# Patient Record
Sex: MALE | Race: WHITE | NOT HISPANIC OR LATINO | URBAN - METROPOLITAN AREA
[De-identification: names, ages, dates, MRNs, and addresses within clinical notes are randomized per-mention and may not be internally consistent; named-entity substitution may affect disease eponyms.]

---

## 2023-06-25 PROBLEM — 236071009: Status: ACTIVE | Noted: 2023-06-25

## 2023-06-25 PROBLEM — 197125005: Status: ACTIVE | Noted: 2023-06-25

## 2023-06-26 ENCOUNTER — LAB OUTSIDE AN ENCOUNTER (OUTPATIENT)
Dept: URBAN - METROPOLITAN AREA CLINIC 113 | Facility: CLINIC | Age: 38
End: 2023-06-26

## 2023-06-26 ENCOUNTER — OFFICE VISIT (OUTPATIENT)
Dept: URBAN - METROPOLITAN AREA CLINIC 113 | Facility: CLINIC | Age: 38
End: 2023-06-26
Payer: COMMERCIAL

## 2023-06-26 ENCOUNTER — WEB ENCOUNTER (OUTPATIENT)
Dept: URBAN - METROPOLITAN AREA CLINIC 113 | Facility: CLINIC | Age: 38
End: 2023-06-26

## 2023-06-26 VITALS
TEMPERATURE: 97.6 F | RESPIRATION RATE: 14 BRPM | DIASTOLIC BLOOD PRESSURE: 80 MMHG | HEIGHT: 71 IN | HEART RATE: 79 BPM | BODY MASS INDEX: 25.2 KG/M2 | SYSTOLIC BLOOD PRESSURE: 129 MMHG | WEIGHT: 180 LBS

## 2023-06-26 DIAGNOSIS — R19.7 ACUTE DIARRHEA: ICD-10-CM

## 2023-06-26 DIAGNOSIS — K82.4 GALLBLADDER POLYP: ICD-10-CM

## 2023-06-26 DIAGNOSIS — R10.84 GENERALIZED ABDOMINAL PAIN: ICD-10-CM

## 2023-06-26 PROBLEM — 102614006: Status: ACTIVE | Noted: 2023-06-26

## 2023-06-26 PROBLEM — 197433003: Status: ACTIVE | Noted: 2023-06-26

## 2023-06-26 PROCEDURE — 99204 OFFICE O/P NEW MOD 45 MIN: CPT | Performed by: INTERNAL MEDICINE

## 2023-06-26 RX ORDER — POLYETHYLENE GLYCOL 3350, SODIUM CHLORIDE, SODIUM BICARBONATE, POTASSIUM CHLORIDE 420; 11.2; 5.72; 1.48 G/4L; G/4L; G/4L; G/4L
AS DIRECTED POWDER, FOR SOLUTION ORAL ONCE
Qty: 420 GM | Refills: 0 | OUTPATIENT
Start: 2023-06-26 | End: 2023-06-27

## 2023-06-26 RX ORDER — FAMOTIDINE 40 MG/1
1 TABLET AT BEDTIME TABLET, FILM COATED ORAL ONCE A DAY
Status: ACTIVE | COMMUNITY

## 2023-06-26 RX ORDER — COLESEVELAM HYDROCHLORIDE 625 MG/1
3 TABLETS WITH MEALS TABLET, COATED ORAL TWICE A DAY
Status: ACTIVE | COMMUNITY

## 2023-06-26 NOTE — HPI-TODAY'S VISIT:
37-year-old referred by Dr. Cyndee Kumar as a another opinion regarding chronic diarrhea.  A copy of this report will be sent to her office.  He has been on Colesevam 625 mg 2 tablets a day and famotidine. The patient states that he's had diarrhea since 2009.  This was during his  service.  Prior to that he had no episodes of diarrhea.  He saw the  doctors but not no etiology was identified.  Would last 4-5 years he's been having worsening diarrhea symptoms.  He saw a gastroenterologist about 3-4 months ago but could not get back in touch with them and he does not remember the names but it was in [place].  They did blood work but nothing else was performed.  He has one to 3 bowel movements per day.  He can be soft or watery sometimes it can even be formed.  He has had some weight loss about 5-10 pounds.  If he does not eat it helps his diarrhea.  He's noticed no blood or melena.  He does get some abdominal pains sometimes in the upper abdomen and sometimes in the lower abdomen.  The lower abdomens are more common.  He rarely will take ibuprofen but has not had any recently.  He has no heartburn but he does use famotidine if he gets up.  He has had no dysphagia.  There is no fevers or chills or skin rashes.  There is no joint pains.  He denies any nausea or vomiting.  He does have a cousin with Crohn's disease.  He does get gaseousness and bloating which includes flatus and night before he starts having diarrhea in the morning.

## 2023-06-28 LAB
(TTG) AB, IGA: <1
(TTG) AB, IGG: <1
A/G RATIO: 1.5
ABSOLUTE BASOPHILS: 117
ABSOLUTE EOSINOPHILS: 179
ABSOLUTE LYMPHOCYTES: 1718
ABSOLUTE MONOCYTES: 697
ABSOLUTE NEUTROPHILS: 4188
ALBUMIN: 4.5
ALKALINE PHOSPHATASE: 65
ALT (SGPT): 14
ANTIGLIADIN ABS, IGA: 22.1
AST (SGOT): 18
BASOPHILS: 1.7
BILIRUBIN, TOTAL: 0.5
BUN/CREATININE RATIO: (no result)
BUN: 8
C-REACTIVE PROTEIN, QUANT: 3.1
CALCIUM: 9.3
CARBON DIOXIDE, TOTAL: 25
CHLORIDE: 103
CREATININE: 0.81
EGFR: 116
EOSINOPHILS: 2.6
GLIADIN (DEAMIDATED) AB (IGA): 22.1
GLIADIN (DEAMIDATED) AB (IGG): <1
GLOBULIN, TOTAL: 3
GLUCOSE: 60
HEMATOCRIT: 43.5
HEMOGLOBIN: 14.6
IMMUNOGLOBULIN A: 394
LYMPHOCYTES: 24.9
MCH: 31
MCHC: 33.6
MCV: 92.4
MONOCYTES: 10.1
MPV: 11
NEUTROPHILS: 60.7
PLATELET COUNT: 309
POTASSIUM: (no result)
PROTEIN, TOTAL: 7.5
RDW: 12.4
RED BLOOD CELL COUNT: 4.71
SODIUM: 138
T-TRANSGLUTAMINASE (TTG) IGA: <1
WHITE BLOOD CELL COUNT: 6.9

## 2023-07-13 LAB
CALPROTECTIN, FECAL: 143
CLOSTRIDIUM DIFFICILE: (no result)
GIARDIA LAMBLIA AG, EIA: (no result)
LACTOFERRIN, FECAL, QUANT.: 26.1
OVA AND PARASITES, CONC AND PERM SMEAR: (no result)
PANCREATIC ELASTASE, FECAL: >500

## 2023-07-24 ENCOUNTER — OUT OF OFFICE VISIT (OUTPATIENT)
Dept: URBAN - METROPOLITAN AREA SURGERY CENTER 25 | Facility: SURGERY CENTER | Age: 38
End: 2023-07-24
Payer: COMMERCIAL

## 2023-07-24 ENCOUNTER — WEB ENCOUNTER (OUTPATIENT)
Dept: URBAN - METROPOLITAN AREA SURGERY CENTER 25 | Facility: SURGERY CENTER | Age: 38
End: 2023-07-24

## 2023-07-24 ENCOUNTER — TELEPHONE ENCOUNTER (OUTPATIENT)
Dept: URBAN - METROPOLITAN AREA CLINIC 113 | Facility: CLINIC | Age: 38
End: 2023-07-24

## 2023-07-24 ENCOUNTER — CLAIMS CREATED FROM THE CLAIM WINDOW (OUTPATIENT)
Dept: URBAN - METROPOLITAN AREA CLINIC 4 | Facility: CLINIC | Age: 38
End: 2023-07-24
Payer: COMMERCIAL

## 2023-07-24 DIAGNOSIS — K63.89 OTHER SPECIFIED DISEASES OF INTESTINE: ICD-10-CM

## 2023-07-24 DIAGNOSIS — K63.89 APPENDICITIS EPIPLOICA: ICD-10-CM

## 2023-07-24 DIAGNOSIS — K52.9 CHRONIC COLITIS: ICD-10-CM

## 2023-07-24 DIAGNOSIS — R19.7 ACUTE DIARRHEA: ICD-10-CM

## 2023-07-24 DIAGNOSIS — R10.84 ABDOMINAL CRAMPING, GENERALIZED: ICD-10-CM

## 2023-07-24 DIAGNOSIS — K52.89 OTHER SPECIFIED NONINFECTIVE GASTROENTERITIS AND COLITIS: ICD-10-CM

## 2023-07-24 DIAGNOSIS — R10.84 GENERALIZED ABDOMINAL PAIN: ICD-10-CM

## 2023-07-24 PROCEDURE — 00811 ANES LWR INTST NDSC NOS: CPT | Performed by: NURSE ANESTHETIST, CERTIFIED REGISTERED

## 2023-07-24 PROCEDURE — G8907 PT DOC NO EVENTS ON DISCHARG: HCPCS | Performed by: INTERNAL MEDICINE

## 2023-07-24 PROCEDURE — 00811 ANES LWR INTST NDSC NOS: CPT | Performed by: ANESTHESIOLOGY

## 2023-07-24 PROCEDURE — 88342 IMHCHEM/IMCYTCHM 1ST ANTB: CPT | Performed by: PATHOLOGY

## 2023-07-24 PROCEDURE — 45380 COLONOSCOPY AND BIOPSY: CPT | Performed by: INTERNAL MEDICINE

## 2023-07-24 PROCEDURE — 88305 TISSUE EXAM BY PATHOLOGIST: CPT | Performed by: PATHOLOGY

## 2023-07-24 RX ORDER — FAMOTIDINE 40 MG/1
1 TABLET AT BEDTIME TABLET, FILM COATED ORAL ONCE A DAY
Status: ACTIVE | COMMUNITY

## 2023-07-24 RX ORDER — COLESEVELAM HYDROCHLORIDE 625 MG/1
3 TABLETS WITH MEALS TABLET, COATED ORAL TWICE A DAY
Status: ACTIVE | COMMUNITY

## 2023-08-14 ENCOUNTER — WEB ENCOUNTER (OUTPATIENT)
Dept: URBAN - METROPOLITAN AREA CLINIC 113 | Facility: CLINIC | Age: 38
End: 2023-08-14

## 2023-08-14 ENCOUNTER — OFFICE VISIT (OUTPATIENT)
Dept: URBAN - METROPOLITAN AREA CLINIC 113 | Facility: CLINIC | Age: 38
End: 2023-08-14
Payer: COMMERCIAL

## 2023-08-14 ENCOUNTER — LAB OUTSIDE AN ENCOUNTER (OUTPATIENT)
Dept: URBAN - METROPOLITAN AREA CLINIC 113 | Facility: CLINIC | Age: 38
End: 2023-08-14

## 2023-08-14 VITALS
SYSTOLIC BLOOD PRESSURE: 134 MMHG | HEART RATE: 71 BPM | HEIGHT: 71 IN | DIASTOLIC BLOOD PRESSURE: 79 MMHG | WEIGHT: 176.6 LBS | RESPIRATION RATE: 20 BRPM | TEMPERATURE: 98 F | BODY MASS INDEX: 24.72 KG/M2

## 2023-08-14 DIAGNOSIS — K82.4 GALLBLADDER POLYP: ICD-10-CM

## 2023-08-14 DIAGNOSIS — R19.7 CHRONIC DIARRHEA: ICD-10-CM

## 2023-08-14 DIAGNOSIS — R10.84 GENERALIZED ABDOMINAL PAIN: ICD-10-CM

## 2023-08-14 PROCEDURE — 99213 OFFICE O/P EST LOW 20 MIN: CPT | Performed by: INTERNAL MEDICINE

## 2023-08-14 RX ORDER — FAMOTIDINE 40 MG/1
1 TABLET AT BEDTIME TABLET, FILM COATED ORAL ONCE A DAY
Status: ACTIVE | COMMUNITY

## 2023-08-14 RX ORDER — METRONIDAZOLE 500 MG/1
1 TABLET TABLET ORAL THREE TIMES A DAY
Qty: 21 TABLET | Refills: 0 | OUTPATIENT
Start: 2023-08-14 | End: 2023-08-21

## 2023-08-14 RX ORDER — COLESEVELAM HYDROCHLORIDE 625 MG/1
3 TABLETS WITH MEALS TABLET, COATED ORAL TWICE A DAY
Status: ACTIVE | COMMUNITY

## 2023-08-14 NOTE — HPI-TODAY'S VISIT:
37-year-old referred by Dr. Cyndee Kumar as a another opinion regarding chronic diarrhea.  A copy of this report will be sent to her office.  He has been on Colesevam 625 mg 2 tablets a day and famotidine. The patient states that he's had diarrhea since 2009.  This was during his  service.  Prior to that he had no episodes of diarrhea.  He saw the  doctors but not no etiology was identified.  Would last 4-5 years he's been having worsening diarrhea symptoms.  He saw a gastroenterologist about 3-4 months ago but could not get back in touch with them and he does not remember the names but it was in [place].  They did blood work but nothing else was performed.  He has had some weight loss about 5-10 pounds.  He rarely will take ibuprofen but has not had any recently.  He has no heartburn but he does use famotidine if he gets up.  He has had no dysphagia.  There is no fevers or chills or skin rashes.  There is no joint pains.  He does have a cousin with Crohn's disease.  He does not feel that the bile binder has made any difference.  He typically has about 2-3 loose bowel moods per day.  There is no fevers or chills blood or melena.  There is no nausea or vomiting.  He occasionally has some cramps in the right upper quadrant area.  He denies any NSAIDs for at least the last couple of months.  Of interest is that he was treated with a medication in the past for about 14 days that made his symptoms go away for about 4 months.  He does not remember the name of that medication.  Blood work on 6/26/2023 revealed hemoglobin 14.6, WBC of 6.9 and platelet count 309,000.  Sodium 138.  Potassium was not done.  BUN 8 creatinine 0.81.  AST 18, ALT 14, alk phosphatase 65, total bili 0.5, albumin 4.5.  CRP is 3.1.

## 2023-08-14 NOTE — HPI-OTHER HISTORIES
Abdominal ultrasound on 8/7/2023 revealed a normal liver and common bile duct.  There is a 4 mm polyp in the gallbladder as well as 2 other tiny polyps 0.3 mm in size.  Labs on 6/26/2023 revealed stool for C. difficile negative, Giardia negative, ova and parasites negative.  Pancreatic fecal elastase greater than 500.  Lactoferrin elevated at 26.1.  Fecal calprotectin elevated at 143.  Celiac sprue studies revealed TTG IgG and IgA less than 1.  Gliadin deamidated IgA was elevated at 22.1 however IgG was negative.  Total IgA level was 394.  Colonoscopy on 7/24/2023 revealed a few spots of erythema in the terminal ileum biopsies revealed prominent lymphoid aggregates.  There was some patchy erythema of the ascending colon and proximal transverse colon biopsies revealed incidental active chronic colitis possibly consistent with NSAIDs.  The rest of the transverse colon, splenic flexure descending colon rectosigmoid and rectum were normal biopsies revealed no significant abnormality.

## 2023-09-05 ENCOUNTER — TELEPHONE ENCOUNTER (OUTPATIENT)
Dept: URBAN - METROPOLITAN AREA CLINIC 113 | Facility: CLINIC | Age: 38
End: 2023-09-05

## 2023-09-13 ENCOUNTER — TELEPHONE ENCOUNTER (OUTPATIENT)
Dept: URBAN - METROPOLITAN AREA CLINIC 113 | Facility: CLINIC | Age: 38
End: 2023-09-13

## 2023-09-18 ENCOUNTER — OFFICE VISIT (OUTPATIENT)
Dept: URBAN - METROPOLITAN AREA CLINIC 107 | Facility: CLINIC | Age: 38
End: 2023-09-18
Payer: COMMERCIAL

## 2023-09-18 VITALS
WEIGHT: 171 LBS | HEIGHT: 71 IN | DIASTOLIC BLOOD PRESSURE: 84 MMHG | SYSTOLIC BLOOD PRESSURE: 133 MMHG | TEMPERATURE: 98.2 F | RESPIRATION RATE: 18 BRPM | HEART RATE: 73 BPM | BODY MASS INDEX: 23.94 KG/M2

## 2023-09-18 DIAGNOSIS — K50.80 CROHN'S COLITIS: ICD-10-CM

## 2023-09-18 DIAGNOSIS — R10.84 GENERALIZED ABDOMINAL PAIN: ICD-10-CM

## 2023-09-18 DIAGNOSIS — R19.7 ACUTE DIARRHEA: ICD-10-CM

## 2023-09-18 PROBLEM — 235709008: Status: ACTIVE | Noted: 2023-09-18

## 2023-09-18 PROCEDURE — 99214 OFFICE O/P EST MOD 30 MIN: CPT | Performed by: NURSE PRACTITIONER

## 2023-09-18 RX ORDER — FAMOTIDINE 40 MG/1
1 TABLET AT BEDTIME TABLET, FILM COATED ORAL ONCE A DAY
Status: ACTIVE | COMMUNITY

## 2023-09-18 RX ORDER — BUDESONIDE 3 MG/1
3 CAPSULES CAPSULE ORAL ONCE A DAY
Qty: 270 CAPSULE | Refills: 0 | OUTPATIENT
Start: 2023-09-18

## 2023-09-18 RX ORDER — COLESEVELAM HYDROCHLORIDE 625 MG/1
3 TABLETS WITH MEALS TABLET, COATED ORAL TWICE A DAY
Status: ON HOLD | COMMUNITY

## 2023-09-18 NOTE — HPI-TODAY'S VISIT:
37-year-old gentleman presenting for follow-up regarding chronic diarrhea ongoing since 2009 refractory to.  Volume 625 mg 2 tablets daily. He states he has had diarrhea since 2009, beginning during his  service.  He was evaluated by  doctors but no etiology for diarrhea was identified.  He was evaluated by another gastroenterology group in [place] with blood work, but no other work-up had been completed.  He described a 5 to 10 pound weight loss.  He described 2-3 bowel movements per day.  Our work-up has included blood work and stool studies.  Blood work revealed a normal hemoglobin, WBC and platelet counts, normal sodium and kidney function.  Liver function tests were also within normal limits.  CRP was mildly elevated at 3.1.  Stool studies were negative for C. difficile, Giardia, ova and parasite.  Pancreatic fecal elastase was normal at greater than 500.  Lactoferrin and fecal calprotectin were both elevated suggestive of inflammation.  Celiac sprue studies revealed normal TTG IgG and IgA.  Creatinine deamidated IgA was elevated at 22.1, however IgG was negative.  Total IgA level was 394.  His colonoscopy in July revealed some erythema in the terminal ileum with biopsies showing prominent lymphoid aggregate.  There was also patchy erythema of the ascending colon and proximal transverse colon with biopsies revealing incidental active chronic colitis possibly consistent with NSAIDs.  He denied NSAID use.  He was recommended an MR enterography to evaluate for possible Crohn's disease.  He was recommended to stop his colesevelam and was provided a 7-day course of metronidazole 500 mg 3 times daily.  Mesalamine was a consideration.  MR enterography was performed 8/25/2023.  There was a grossly 2 cm segment of active inflammatory changes involving the terminal ileum suggesting infectious versus inflammatory terminal ileitis.  No prestenotic dilation, fibrostenotic change or evidence of penetration disease.  Given MRI enterography findings, he was recommended a course of Entocort. He continues with frequent diarrhea.  He describes urgency after meals.  Last week, he was having up to 10 bowel movements per day.  This morning, he woke up at 5 AM in order to be able to completely empty his bowels before leaving from [place] to come to his office appointment in [place].  He has already had at least 3 bowel movements this morning by 11 AM.  He describes urgency and abdominal discomfort with meals.  He took the course of metronidazole as recommended, and felt worse during that 7-days.  Since completing Flagyl, he does not feel that he is any better.

## 2023-09-28 ENCOUNTER — TELEPHONE ENCOUNTER (OUTPATIENT)
Dept: URBAN - METROPOLITAN AREA CLINIC 113 | Facility: CLINIC | Age: 38
End: 2023-09-28

## 2023-10-15 LAB
HEPATITIS B CORE AB TOTAL: (no result)
HEPATITIS B SURFACE ANTIGEN: (no result)
HEPATITIS C ANTIBODY: (no result)
MITOGEN-NIL: >10
QUANTIFERON NIL VALUE: 0.04
QUANTIFERON TB1 AG VALUE: 0.05
QUANTIFERON TB2 AG VALUE: 0.01
QUANTIFERON-TB GOLD PLUS: NEGATIVE

## 2023-10-19 ENCOUNTER — OFFICE VISIT (OUTPATIENT)
Dept: URBAN - METROPOLITAN AREA CLINIC 113 | Facility: CLINIC | Age: 38
End: 2023-10-19

## 2023-10-22 PROBLEM — 71833008: Status: ACTIVE | Noted: 2023-10-22

## 2023-10-23 ENCOUNTER — DASHBOARD ENCOUNTERS (OUTPATIENT)
Age: 38
End: 2023-10-23

## 2023-10-23 ENCOUNTER — OFFICE VISIT (OUTPATIENT)
Dept: URBAN - METROPOLITAN AREA CLINIC 113 | Facility: CLINIC | Age: 38
End: 2023-10-23
Payer: COMMERCIAL

## 2023-10-23 VITALS
HEIGHT: 71 IN | BODY MASS INDEX: 23.83 KG/M2 | WEIGHT: 170.2 LBS | SYSTOLIC BLOOD PRESSURE: 118 MMHG | DIASTOLIC BLOOD PRESSURE: 70 MMHG | TEMPERATURE: 98.9 F | HEART RATE: 72 BPM

## 2023-10-23 DIAGNOSIS — K50.818 CROHN'S DISEASE OF BOTH SMALL AND LARGE INTESTINE WITH OTHER COMPLICATION: ICD-10-CM

## 2023-10-23 DIAGNOSIS — K52.89 OTHER SPECIFIED NONINFECTIVE GASTROENTERITIS AND COLITIS: ICD-10-CM

## 2023-10-23 DIAGNOSIS — R10.84 GENERALIZED ABDOMINAL PAIN: ICD-10-CM

## 2023-10-23 PROCEDURE — 99203 OFFICE O/P NEW LOW 30 MIN: CPT | Performed by: INTERNAL MEDICINE

## 2023-10-23 RX ORDER — COLESEVELAM HYDROCHLORIDE 625 MG/1
3 TABLETS WITH MEALS TABLET, COATED ORAL TWICE A DAY
Status: ON HOLD | COMMUNITY

## 2023-10-23 RX ORDER — BUDESONIDE 3 MG/1
3 CAPSULES CAPSULE ORAL ONCE A DAY
Qty: 270 CAPSULE | Refills: 0 | OUTPATIENT

## 2023-10-23 RX ORDER — FAMOTIDINE 40 MG/1
1 TABLET AT BEDTIME TABLET, FILM COATED ORAL ONCE A DAY
Status: ACTIVE | COMMUNITY

## 2023-10-23 RX ORDER — BUDESONIDE 3 MG/1
3 CAPSULES CAPSULE ORAL ONCE A DAY
Qty: 270 CAPSULE | Refills: 0 | Status: ACTIVE | COMMUNITY
Start: 2023-09-18

## 2023-10-23 NOTE — HPI-OTHER HISTORIES
MRI enterography of the small intestine on 8/25/2023 revealed some subcentimeter left hepatic lobe cyst.  The pancreas and spleen were normal there is a short segment of thickening and mucosal enhancement involving 2 cm segment in the terminal ileum with adjacent small lymph nodes.  No fibrostenotic or penetrating disease seen.  The colon appeared normal Colonoscopy on 7/24/2023 revealed some patchy erythema in the terminal ileum there were tiny spots of erythema biopsies showed prominent mucosal lymphoid aggregates.  There was patchy areas of erythema in the ascending colon and proximal transverse colon with normal mucosa in between.  Biopsies revealed incidental active chronic colitis.  The colon was normal in the rectum, sigmoid descending colon, splenic flexure and transverse colon.  Random colon biopsies showed no significant abnormality retroflexed view of the rectum was unremarkable and there were no colon polyps.

## 2023-10-23 NOTE — HPI-TODAY'S VISIT:
37-year-old gentleman presenting for follow-up regarding chronic diarrhea ongoing since 2009 refractory to colesevelam 625 mg 2 tablets daily. He states he has had diarrhea since 2009, beginning during his  service.  He was evaluated by  doctors but no etiology for diarrhea was identified.  He was evaluated by another gastroenterology group in [place] with blood work, but no other work-up had been completed.  He described a 5 to 10 pound weight loss.  He described 2-3 bowel movements per day.  Our work-up has included blood work and stool studies.  Blood work revealed a normal hemoglobin, WBC and platelet counts, normal sodium and kidney function.  Liver function tests were also within normal limits.  CRP was mildly elevated at 3.1.  Stool studies were negative for C. difficile, Giardia, ova and parasite.  Pancreatic fecal elastase was normal at greater than 500.  Lactoferrin and fecal calprotectin were both elevated suggestive of inflammation.  Celiac sprue studies revealed normal TTG IgG and IgA.  Creatinine deamidated IgA was elevated at 22.1, however IgG was negative.  Total IgA level was 394. He denied NSAID use. He was recommended to stop his colesevelam and was provided a 7-day course of metronidazole 500 mg 3 times daily.  Mesalamine was a consideration. Given MRI enterography showing ileitis,  he was recommended a course of Entocort. He continues with frequent diarrhea.  He describes urgency after meals.  Last week, he was having up to 10 bowel movements per day.  This morning, he woke up at 5 AM in order to be able to completely empty his bowels before leaving from [place] to come to his office appointment in [place].  He has already had at least 3 bowel movements this morning by 11 AM.  He describes urgency and abdominal discomfort with meals.  He took the course of metronidazole as recommended, and felt worse during that 7-days.  Since completing Flagyl, he does not feel that he is any better. Been on budesonide 3 mg tablets 3 tablets a day.  He continues to have some difficulty although his stools are now watery they are just very loose.  He will have 2 bowel movements in the morning and then 1 about 45 minutes when he gets to work.  There is no blood or melena.  No abdominal pain.  He has had no fevers or chills.  He has heartburn symptoms about 3-4 times per week for for which he uses famotidine.  He has had no dysphagia.  He notices that anxiety, stress, getting upset seems to make his diarrhea worse.  On 9/28/2023 QuantiFERON gold TB test negative.  Hepatitis B surface antigen negative, hepatitis B core antibody total negative, hepatitis C antibody negative.

## 2023-10-30 ENCOUNTER — OFFICE VISIT (OUTPATIENT)
Dept: URBAN - METROPOLITAN AREA CLINIC 107 | Facility: CLINIC | Age: 38
End: 2023-10-30

## 2023-12-14 ENCOUNTER — ERX REFILL RESPONSE (OUTPATIENT)
Dept: URBAN - METROPOLITAN AREA CLINIC 113 | Facility: CLINIC | Age: 38
End: 2023-12-14

## 2023-12-14 RX ORDER — BUDESONIDE 3 MG/1
TAKE 3 CAPSULES BY MOUTH DAILY CAPSULE, GELATIN COATED ORAL
Qty: 270 CAPSULE | Refills: 0 | OUTPATIENT

## 2023-12-14 RX ORDER — BUDESONIDE 3 MG/1
3 CAPSULES CAPSULE ORAL ONCE A DAY
Qty: 270 CAPSULE | Refills: 0 | OUTPATIENT

## 2024-01-11 ENCOUNTER — TELEPHONE ENCOUNTER (OUTPATIENT)
Dept: URBAN - METROPOLITAN AREA CLINIC 113 | Facility: CLINIC | Age: 39
End: 2024-01-11